# Patient Record
Sex: FEMALE | Race: WHITE
[De-identification: names, ages, dates, MRNs, and addresses within clinical notes are randomized per-mention and may not be internally consistent; named-entity substitution may affect disease eponyms.]

---

## 2019-01-08 ENCOUNTER — HOSPITAL ENCOUNTER (EMERGENCY)
Dept: HOSPITAL 54 - ER | Age: 49
Discharge: HOME | End: 2019-01-08
Payer: COMMERCIAL

## 2019-01-08 VITALS — DIASTOLIC BLOOD PRESSURE: 60 MMHG | SYSTOLIC BLOOD PRESSURE: 90 MMHG

## 2019-01-08 VITALS — BODY MASS INDEX: 22.33 KG/M2 | WEIGHT: 134 LBS | HEIGHT: 65 IN

## 2019-01-08 DIAGNOSIS — E03.9: ICD-10-CM

## 2019-01-08 DIAGNOSIS — E86.0: Primary | ICD-10-CM

## 2019-01-08 DIAGNOSIS — F41.9: ICD-10-CM

## 2019-01-08 LAB
ALBUMIN SERPL BCP-MCNC: 3.7 G/DL (ref 3.4–5)
ALP SERPL-CCNC: 49 U/L (ref 46–116)
ALT SERPL W P-5'-P-CCNC: 17 U/L (ref 12–78)
AST SERPL W P-5'-P-CCNC: 16 U/L (ref 15–37)
BASOPHILS # BLD AUTO: 0 /CMM (ref 0–0.2)
BASOPHILS NFR BLD AUTO: 0.2 % (ref 0–2)
BILIRUB DIRECT SERPL-MCNC: 0.1 MG/DL (ref 0–0.2)
BILIRUB SERPL-MCNC: 0.7 MG/DL (ref 0.2–1)
BUN SERPL-MCNC: 18 MG/DL (ref 7–18)
CALCIUM SERPL-MCNC: 8.6 MG/DL (ref 8.5–10.1)
CHLORIDE SERPL-SCNC: 102 MMOL/L (ref 98–107)
CO2 SERPL-SCNC: 27 MMOL/L (ref 21–32)
CREAT SERPL-MCNC: 0.8 MG/DL (ref 0.6–1.3)
EOSINOPHIL NFR BLD AUTO: 2.8 % (ref 0–6)
GLUCOSE SERPL-MCNC: 78 MG/DL (ref 74–106)
HCT VFR BLD AUTO: 43 % (ref 33–45)
HGB BLD-MCNC: 14.7 G/DL (ref 11.5–14.8)
LYMPHOCYTES NFR BLD AUTO: 1.4 /CMM (ref 0.8–4.8)
LYMPHOCYTES NFR BLD AUTO: 18.4 % (ref 20–44)
MCHC RBC AUTO-ENTMCNC: 34 G/DL (ref 31–36)
MCV RBC AUTO: 93 FL (ref 82–100)
MONOCYTES NFR BLD AUTO: 0.4 /CMM (ref 0.1–1.3)
MONOCYTES NFR BLD AUTO: 5 % (ref 2–12)
NEUTROPHILS # BLD AUTO: 5.5 /CMM (ref 1.8–8.9)
NEUTROPHILS NFR BLD AUTO: 73.6 % (ref 43–81)
PLATELET # BLD AUTO: 222 /CMM (ref 150–450)
POTASSIUM SERPL-SCNC: 4 MMOL/L (ref 3.5–5.1)
PROT SERPL-MCNC: 7.5 G/DL (ref 6.4–8.2)
RBC # BLD AUTO: 4.68 MIL/UL (ref 4–5.2)
SODIUM SERPL-SCNC: 135 MMOL/L (ref 136–145)
WBC NRBC COR # BLD AUTO: 7.5 K/UL (ref 4.3–11)

## 2019-01-08 PROCEDURE — 85025 COMPLETE CBC W/AUTO DIFF WBC: CPT

## 2019-01-08 PROCEDURE — 36415 COLL VENOUS BLD VENIPUNCTURE: CPT

## 2019-01-08 PROCEDURE — 99284 EMERGENCY DEPT VISIT MOD MDM: CPT

## 2019-01-08 PROCEDURE — 96361 HYDRATE IV INFUSION ADD-ON: CPT

## 2019-01-08 PROCEDURE — 84484 ASSAY OF TROPONIN QUANT: CPT

## 2019-01-08 PROCEDURE — 93005 ELECTROCARDIOGRAM TRACING: CPT

## 2019-01-08 PROCEDURE — 85730 THROMBOPLASTIN TIME PARTIAL: CPT

## 2019-01-08 PROCEDURE — 80076 HEPATIC FUNCTION PANEL: CPT

## 2019-01-08 PROCEDURE — 71045 X-RAY EXAM CHEST 1 VIEW: CPT

## 2019-01-08 PROCEDURE — 80048 BASIC METABOLIC PNL TOTAL CA: CPT

## 2019-01-08 PROCEDURE — 84443 ASSAY THYROID STIM HORMONE: CPT

## 2019-01-08 PROCEDURE — 70450 CT HEAD/BRAIN W/O DYE: CPT

## 2019-01-08 PROCEDURE — 96374 THER/PROPH/DIAG INJ IV PUSH: CPT

## 2019-01-08 NOTE — NUR
Patient discharged to home in stable condition. Written and verbal after care 
instructions given. Patient verbalizes understanding of instruction. IV 
removed. Catheter intact and site benign. Pressure and 4x4 applied to site. No 
bleeding noted.

## 2019-01-08 NOTE — NUR
PT BIBSELF FROM HOME, C/O N/V/FAINTNESS SINCE THIS AM, PT AAOX4, RESPIRATIONS 
EVEN AND UNLABORED, NO SOB, DENIES AND PAIN/DISCOMFORT, PT ON MONITOR, PENDING 
ER PROVIDER EVELIN

## 2019-04-03 ENCOUNTER — HOSPITAL ENCOUNTER (INPATIENT)
Dept: HOSPITAL 54 - ER | Age: 49
LOS: 4 days | Discharge: HOME | DRG: 248 | End: 2019-04-07
Attending: INTERNAL MEDICINE | Admitting: INTERNAL MEDICINE
Payer: COMMERCIAL

## 2019-04-03 VITALS — HEIGHT: 67 IN | BODY MASS INDEX: 20.4 KG/M2 | WEIGHT: 130 LBS

## 2019-04-03 DIAGNOSIS — E87.6: ICD-10-CM

## 2019-04-03 DIAGNOSIS — A04.9: Primary | ICD-10-CM

## 2019-04-03 DIAGNOSIS — E86.0: ICD-10-CM

## 2019-04-03 DIAGNOSIS — B00.1: ICD-10-CM

## 2019-04-03 DIAGNOSIS — E88.09: ICD-10-CM

## 2019-04-03 DIAGNOSIS — R74.8: ICD-10-CM

## 2019-04-03 DIAGNOSIS — E03.9: ICD-10-CM

## 2019-04-03 DIAGNOSIS — G47.00: ICD-10-CM

## 2019-04-03 DIAGNOSIS — E87.5: ICD-10-CM

## 2019-04-03 DIAGNOSIS — E87.2: ICD-10-CM

## 2019-04-03 DIAGNOSIS — E83.42: ICD-10-CM

## 2019-04-03 DIAGNOSIS — E44.1: ICD-10-CM

## 2019-04-03 DIAGNOSIS — I95.9: ICD-10-CM

## 2019-04-03 DIAGNOSIS — E87.1: ICD-10-CM

## 2019-04-03 LAB
ALBUMIN SERPL BCP-MCNC: 3.3 G/DL (ref 3.4–5)
ALP SERPL-CCNC: 94 U/L (ref 46–116)
ALT SERPL W P-5'-P-CCNC: 11 U/L (ref 12–78)
AST SERPL W P-5'-P-CCNC: 15 U/L (ref 15–37)
BASOPHILS # BLD AUTO: 0 /CMM (ref 0–0.2)
BASOPHILS NFR BLD AUTO: 0.1 % (ref 0–2)
BILIRUB DIRECT SERPL-MCNC: 0.1 MG/DL (ref 0–0.2)
BILIRUB SERPL-MCNC: 0.8 MG/DL (ref 0.2–1)
BUN SERPL-MCNC: 6 MG/DL (ref 7–18)
CALCIUM SERPL-MCNC: 9.4 MG/DL (ref 8.5–10.1)
CHLORIDE SERPL-SCNC: 98 MMOL/L (ref 98–107)
CO2 SERPL-SCNC: 22 MMOL/L (ref 21–32)
CREAT SERPL-MCNC: 0.5 MG/DL (ref 0.6–1.3)
EOSINOPHIL NFR BLD AUTO: 2.2 % (ref 0–6)
GLUCOSE SERPL-MCNC: 95 MG/DL (ref 74–106)
HCT VFR BLD AUTO: 51 % (ref 33–45)
HGB BLD-MCNC: 16.7 G/DL (ref 11.5–14.8)
LIPASE SERPL-CCNC: 57 U/L (ref 73–393)
LYMPHOCYTES NFR BLD AUTO: 1.6 /CMM (ref 0.8–4.8)
LYMPHOCYTES NFR BLD AUTO: 17.5 % (ref 20–44)
MCHC RBC AUTO-ENTMCNC: 33 G/DL (ref 31–36)
MCV RBC AUTO: 95 FL (ref 82–100)
MONOCYTES NFR BLD AUTO: 0.7 /CMM (ref 0.1–1.3)
MONOCYTES NFR BLD AUTO: 7.5 % (ref 2–12)
NEUTROPHILS # BLD AUTO: 6.7 /CMM (ref 1.8–8.9)
NEUTROPHILS NFR BLD AUTO: 72.7 % (ref 43–81)
PLATELET # BLD AUTO: 282 /CMM (ref 150–450)
POTASSIUM SERPL-SCNC: 3 MMOL/L (ref 3.5–5.1)
PROT SERPL-MCNC: 7.8 G/DL (ref 6.4–8.2)
RBC # BLD AUTO: 5.39 MIL/UL (ref 4–5.2)
SODIUM SERPL-SCNC: 135 MMOL/L (ref 136–145)
T4 (THYROXINE): 12.1 UG/DL (ref 4.7–13.3)
TSH SERPL DL<=0.005 MIU/L-ACNC: 1.84 UIU/ML (ref 0.36–3.74)
WBC NRBC COR # BLD AUTO: 9.2 K/UL (ref 4.3–11)

## 2019-04-03 PROCEDURE — G0378 HOSPITAL OBSERVATION PER HR: HCPCS

## 2019-04-03 PROCEDURE — A4216 STERILE WATER/SALINE, 10 ML: HCPCS

## 2019-04-03 RX ADMIN — POTASSIUM CHLORIDE SCH MLS/HR: 200 INJECTION, SOLUTION INTRAVENOUS at 23:35

## 2019-04-03 NOTE — NUR
PT BBRELATIVE FROM HOME C/C N/V/D X4 DAYS, UNABLE TO KEEP FLUIDS DOWN. 
AFEBRILE. PT C/O OF DIZZINESS, WEAKNESS WHEN STANDING OR SITTING. DENIES 
CARDIAC HX. PT ON MONITOR IN BED 9 WITH FAMILY AT BEDSIDE. WILL CONTINUE TO 
MONITOR.

## 2019-04-04 VITALS — SYSTOLIC BLOOD PRESSURE: 106 MMHG | DIASTOLIC BLOOD PRESSURE: 66 MMHG

## 2019-04-04 VITALS — SYSTOLIC BLOOD PRESSURE: 108 MMHG | DIASTOLIC BLOOD PRESSURE: 65 MMHG

## 2019-04-04 VITALS — DIASTOLIC BLOOD PRESSURE: 62 MMHG | SYSTOLIC BLOOD PRESSURE: 107 MMHG

## 2019-04-04 VITALS — DIASTOLIC BLOOD PRESSURE: 66 MMHG | SYSTOLIC BLOOD PRESSURE: 107 MMHG

## 2019-04-04 VITALS — SYSTOLIC BLOOD PRESSURE: 119 MMHG | DIASTOLIC BLOOD PRESSURE: 71 MMHG

## 2019-04-04 VITALS — SYSTOLIC BLOOD PRESSURE: 102 MMHG | DIASTOLIC BLOOD PRESSURE: 64 MMHG

## 2019-04-04 LAB
ALBUMIN SERPL BCP-MCNC: 3.2 G/DL (ref 3.4–5)
ALBUMIN SERPL BCP-MCNC: 3.3 G/DL (ref 3.4–5)
ALP SERPL-CCNC: 49 U/L (ref 46–116)
ALP SERPL-CCNC: 50 U/L (ref 46–116)
ALT SERPL W P-5'-P-CCNC: 17 U/L (ref 12–78)
ALT SERPL W P-5'-P-CCNC: 19 U/L (ref 12–78)
AST SERPL W P-5'-P-CCNC: 22 U/L (ref 15–37)
AST SERPL W P-5'-P-CCNC: 25 U/L (ref 15–37)
BASOPHILS # BLD AUTO: 0 /CMM (ref 0–0.2)
BASOPHILS NFR BLD AUTO: 0.1 % (ref 0–2)
BILIRUB SERPL-MCNC: 0.5 MG/DL (ref 0.2–1)
BILIRUB SERPL-MCNC: 0.6 MG/DL (ref 0.2–1)
BILIRUB UR QL STRIP: (no result)
BUN SERPL-MCNC: 15 MG/DL (ref 7–18)
BUN SERPL-MCNC: 17 MG/DL (ref 7–18)
CALCIUM SERPL-MCNC: 8.3 MG/DL (ref 8.5–10.1)
CALCIUM SERPL-MCNC: 8.4 MG/DL (ref 8.5–10.1)
CHLORIDE SERPL-SCNC: 102 MMOL/L (ref 98–107)
CHLORIDE SERPL-SCNC: 105 MMOL/L (ref 98–107)
CO2 SERPL-SCNC: 10 MMOL/L (ref 21–32)
CO2 SERPL-SCNC: 7 MMOL/L (ref 21–32)
CREAT SERPL-MCNC: 1.1 MG/DL (ref 0.6–1.3)
CREAT SERPL-MCNC: 1.1 MG/DL (ref 0.6–1.3)
EOSINOPHIL NFR BLD AUTO: 0 % (ref 0–6)
GLUCOSE SERPL-MCNC: 155 MG/DL (ref 74–106)
GLUCOSE SERPL-MCNC: 96 MG/DL (ref 74–106)
HCT VFR BLD AUTO: 45 % (ref 33–45)
HGB BLD-MCNC: 14.7 G/DL (ref 11.5–14.8)
KETONES UR STRIP-MCNC: >=160 MG/DL
LYMPHOCYTES NFR BLD AUTO: 0.3 /CMM (ref 0.8–4.8)
LYMPHOCYTES NFR BLD AUTO: 6.4 % (ref 20–44)
MAGNESIUM SERPL-MCNC: 1.5 MG/DL (ref 1.8–2.4)
MCHC RBC AUTO-ENTMCNC: 33 G/DL (ref 31–36)
MCV RBC AUTO: 94 FL (ref 82–100)
MONOCYTES NFR BLD AUTO: 0.1 /CMM (ref 0.1–1.3)
MONOCYTES NFR BLD AUTO: 1 % (ref 2–12)
NEUTROPHILS # BLD AUTO: 4.8 /CMM (ref 1.8–8.9)
NEUTROPHILS NFR BLD AUTO: 92.5 % (ref 43–81)
PH UR STRIP: 5.5 [PH] (ref 5–8)
PHOSPHATE SERPL-MCNC: 2.8 MG/DL (ref 2.5–4.9)
PLATELET # BLD AUTO: 177 /CMM (ref 150–450)
POTASSIUM SERPL-SCNC: 5.3 MMOL/L (ref 3.5–5.1)
POTASSIUM SERPL-SCNC: 6.3 MMOL/L (ref 3.5–5.1)
PROT SERPL-MCNC: 7.2 G/DL (ref 6.4–8.2)
PROT SERPL-MCNC: 7.4 G/DL (ref 6.4–8.2)
PROT UR QL STRIP: 100 MG/DL
RBC # BLD AUTO: 4.76 MIL/UL (ref 4–5.2)
RBC #/AREA URNS HPF: (no result) /HPF (ref 0–2)
SODIUM SERPL-SCNC: 131 MMOL/L (ref 136–145)
SODIUM SERPL-SCNC: 133 MMOL/L (ref 136–145)
UROBILINOGEN UR STRIP-MCNC: 0.2 EU/DL
WBC #/AREA URNS HPF: (no result) /HPF (ref 0–3)
WBC NRBC COR # BLD AUTO: 5.2 K/UL (ref 4.3–11)

## 2019-04-04 RX ADMIN — MAGNESIUM SULFATE IN DEXTROSE SCH MLS/HR: 10 INJECTION, SOLUTION INTRAVENOUS at 16:46

## 2019-04-04 RX ADMIN — POTASSIUM CHLORIDE SCH MLS/HR: 200 INJECTION, SOLUTION INTRAVENOUS at 02:22

## 2019-04-04 RX ADMIN — PANTOPRAZOLE SODIUM SCH MG: 40 TABLET, DELAYED RELEASE ORAL at 08:01

## 2019-04-04 RX ADMIN — PIPERACILLIN SODIUM AND TAZOBACTAM SODIUM SCH MLS/HR: .375; 3 INJECTION, POWDER, LYOPHILIZED, FOR SOLUTION INTRAVENOUS at 10:54

## 2019-04-04 RX ADMIN — POTASSIUM CHLORIDE SCH MLS/HR: 200 INJECTION, SOLUTION INTRAVENOUS at 01:17

## 2019-04-04 RX ADMIN — MAGNESIUM SULFATE IN DEXTROSE SCH MLS/HR: 10 INJECTION, SOLUTION INTRAVENOUS at 12:02

## 2019-04-04 RX ADMIN — PIPERACILLIN SODIUM AND TAZOBACTAM SODIUM SCH MLS/HR: .375; 3 INJECTION, POWDER, LYOPHILIZED, FOR SOLUTION INTRAVENOUS at 18:59

## 2019-04-04 RX ADMIN — POTASSIUM CHLORIDE SCH MLS/HR: 200 INJECTION, SOLUTION INTRAVENOUS at 03:38

## 2019-04-04 NOTE — NUR
RN CLOSING NOTE 



PATIENT AWAKE AND ALERT. NO COMPLAINS OF ANY PAIN OR ANY DISTRESS DURING THE DAY SHIFT. HAD 
4X DIARRHEA TODAY. MD AWARE. REPLACED MAGNESIUM AND CHANGED IVF TO NS  ML/HR DUE TO 
HYPERKALEMIA. ALL MEDS ARE GIVEN, ALL NEEDS MET. BED ON LOWEST POSITION. CALL LIGHT WITHIN 
REACH. WILL CONT TO MONITOR CLOSELY. 

-------------------------------------------------------------------------------

Addendum: 04/04/19 at 1852 by CAROLINA CAPONE RN

-------------------------------------------------------------------------------

WILL ENDORSE TO NOC CANDE RN

## 2019-04-04 NOTE — NUR
RN NOTES

RECEIVED PT. FROM ER WITH DX. OF DIFFUSE COLITIS, ST ON TELE MONITOR HR-125, A/OX4, 
AMBULATORY, DENIES PAIN, NO N/V, ADMISSION INSTRUCTION WAS GIVEN, CALL LIGHT WITHIN REACH, 
SIDERAILSUPX2, CONTINUE TO MONITOR

## 2019-04-04 NOTE — NUR
RN OPENING NOTE



RECEIVED PATIENT IN BED AWAKE AND ALERT. AMBULATED TO GO TO THE BATHROOM. CAME IN LAST NIGHT 
DUE TO DIFFUSE COLITIS. PATIENT ON TELE MONITOR, [place] 114. NOC SHIFT RN SAID MD IS 
AWARE. PATIENT ON CLEAR LIQUID DIET. HAS LEFT AC #20 WITH NS + 20 MEQ OF KCL RUNNING AT 
100ML/HR. SKIN IS INTACT. BED ON LOWEST POSITION. CALL LIGHT WITHIN REACH. WILL CONTINUE TO 
MONITOR THROUGHOUT THE SHIFT

## 2019-04-04 NOTE — NUR
TELE RN NOTE



RECEIVED PT IN STABLE CONDITION, A&O X4, ABLE TO MAKE NEEDS KNOWN. CURRENTLY WATCHING TV 
WITH GUEST. TELE MONITOR READING . IV IN L AC PATENT AND INTACT WITH IVF INFUSING. NO 
SIGNS OF SOB OR DISTRESS, NO C/O PAIN. ALL CURRENT NEEDS ATTENDED TO. SAFETY PRECAUTIONS IN 
PLACE: BED LOW, LOCKED, UPPER RAILS UP, AND CALL LIGHT WITHIN RANGE. WILL CONT. TO MONITOR.

## 2019-04-04 NOTE — NUR
RN NOTE



PATIENT REPORTED THAT SHE HAD A LARGE BM, DIARRHEA. GREEN/GOLD COLOR, NO BLOOD WAS NOTED. 

-------------------------------------------------------------------------------

Addendum: 04/04/19 at 1835 by CAROLINA CAPONE RN

-------------------------------------------------------------------------------

TOTAL BM TODAY WAS 4. ALL DIARRHEA, DR RILEY AWARE

## 2019-04-05 VITALS — SYSTOLIC BLOOD PRESSURE: 126 MMHG | DIASTOLIC BLOOD PRESSURE: 60 MMHG

## 2019-04-05 VITALS — SYSTOLIC BLOOD PRESSURE: 97 MMHG | DIASTOLIC BLOOD PRESSURE: 64 MMHG

## 2019-04-05 VITALS — SYSTOLIC BLOOD PRESSURE: 102 MMHG | DIASTOLIC BLOOD PRESSURE: 68 MMHG

## 2019-04-05 VITALS — DIASTOLIC BLOOD PRESSURE: 60 MMHG | SYSTOLIC BLOOD PRESSURE: 99 MMHG

## 2019-04-05 VITALS — DIASTOLIC BLOOD PRESSURE: 60 MMHG | SYSTOLIC BLOOD PRESSURE: 126 MMHG

## 2019-04-05 VITALS — DIASTOLIC BLOOD PRESSURE: 59 MMHG | SYSTOLIC BLOOD PRESSURE: 102 MMHG

## 2019-04-05 VITALS — DIASTOLIC BLOOD PRESSURE: 56 MMHG | SYSTOLIC BLOOD PRESSURE: 109 MMHG

## 2019-04-05 LAB
BUN SERPL-MCNC: 14 MG/DL (ref 7–18)
CALCIUM SERPL-MCNC: 8.5 MG/DL (ref 8.5–10.1)
CHLORIDE SERPL-SCNC: 105 MMOL/L (ref 98–107)
CO2 SERPL-SCNC: 14 MMOL/L (ref 21–32)
CREAT SERPL-MCNC: 0.8 MG/DL (ref 0.6–1.3)
GLUCOSE SERPL-MCNC: 166 MG/DL (ref 74–106)
MAGNESIUM SERPL-MCNC: 1.8 MG/DL (ref 1.8–2.4)
POTASSIUM SERPL-SCNC: 4.7 MMOL/L (ref 3.5–5.1)
SODIUM SERPL-SCNC: 134 MMOL/L (ref 136–145)
T3 SERPL-MCNC: 88 NG/DL (ref 71–180)
T3FREE SERPL-MCNC: 2 PG/ML (ref 2–4.4)

## 2019-04-05 RX ADMIN — PIPERACILLIN SODIUM AND TAZOBACTAM SODIUM SCH MLS/HR: .375; 3 INJECTION, POWDER, LYOPHILIZED, FOR SOLUTION INTRAVENOUS at 11:06

## 2019-04-05 RX ADMIN — Medication SCH MLS/HR: at 21:00

## 2019-04-05 RX ADMIN — PIPERACILLIN SODIUM AND TAZOBACTAM SODIUM SCH MLS/HR: .375; 3 INJECTION, POWDER, LYOPHILIZED, FOR SOLUTION INTRAVENOUS at 02:00

## 2019-04-05 RX ADMIN — SODIUM CHLORIDE, SODIUM LACTATE, POTASSIUM CHLORIDE, AND CALCIUM CHLORIDE PRN MLS/HR: .6; .31; .03; .02 INJECTION, SOLUTION INTRAVENOUS at 18:01

## 2019-04-05 RX ADMIN — PIPERACILLIN SODIUM AND TAZOBACTAM SODIUM SCH MLS/HR: .375; 3 INJECTION, POWDER, LYOPHILIZED, FOR SOLUTION INTRAVENOUS at 19:42

## 2019-04-05 RX ADMIN — Medication SCH MLS/HR: at 18:00

## 2019-04-05 RX ADMIN — PANTOPRAZOLE SODIUM SCH MG: 40 TABLET, DELAYED RELEASE ORAL at 07:52

## 2019-04-05 NOTE — NUR
RN OPENING NOTES

RECEIVED REPORT FROM NIGHT SHIFT RN. PT IS ON TELE MONITOR READING SR 90'S. PT IS A&OX 4. PT 
IS ABLE TO MAKE NEEDS KNOWN. PT DENIES ANY PAIN AT PRESENT MOMENT AND SOB. CALL LIGHT IS 
WITHIN REACH AND BED IS IN LOWEST AND LOCKED POSITION PT ENCOURAGED TO CALL STAFF IF SHE 
WISHES TO AMBULATE. WILL CONTINUE TO MONITOR.

## 2019-04-05 NOTE — NUR
RN CLOSING NOTES

GAVE REPORT TO NIGHT SHIFT RN. PT IS ON TELE MONITOR READING SR 80'S. PT IS A&OX 4. PT IS 
ABLE TO MAKE NEEDS KNOWN. PT DENIES ANY PAIN AT PRESENT MOMENT AND SOB. CALL LIGHT IS WITHIN 
REACH AND BED IS IN LOWEST AND LOCKED POSITION PT ENCOURAGED TO CALL STAFF IF SHE WISHES TO 
AMBULATE. WILL ENDORSE CONTINUITY OF CARE TO NIGHT SHIFT RN.

## 2019-04-05 NOTE — NUR
TELE RN NOTE



PT IN STABLE CONDITION, A&O X4, ABLE TO MAKE NEEDS KNOWN. TELE MONITOR READING SR 90. IV IN 
L AC PATENT AND INTACT WITH IVF INFUSING. NO SIGNS OF SOB OR DISTRESS, NO C/O PAIN. ALL 
CURRENT NEEDS ATTENDED TO. SAFETY PRECAUTIONS IN PLACE: BED LOW, LOCKED, UPPER RAILS UP, AND 
CALL LIGHT WITHIN RANGE. WILL CONT. TO MONITOR AND ENDORSE TO NEXT SHIFT FOR ALEX.

## 2019-04-05 NOTE — NUR
RN INITIAL NOTES

RECEIVED REPORT FROM AM SHIFT RN. PT IS ON TELE MONITOR READING SR 80'S. PT IS A&OX 4. PT IS 
ABLE TO MAKE NEEDS KNOWN. PT DENIES ANY PAIN AT PRESENT MOMENT AND SOB. CALL LIGHT IS WITHIN 
REACH AND BED IS IN LOWEST AND LOCKED POSITION PT ENCOURAGED TO CALL STAFF IF SHE WISHES TO 
AMBULATE. WILL CONTINUE TO MONITOR.

## 2019-04-06 VITALS — SYSTOLIC BLOOD PRESSURE: 110 MMHG | DIASTOLIC BLOOD PRESSURE: 60 MMHG

## 2019-04-06 VITALS — SYSTOLIC BLOOD PRESSURE: 105 MMHG | DIASTOLIC BLOOD PRESSURE: 65 MMHG

## 2019-04-06 VITALS — DIASTOLIC BLOOD PRESSURE: 60 MMHG | SYSTOLIC BLOOD PRESSURE: 102 MMHG

## 2019-04-06 VITALS — SYSTOLIC BLOOD PRESSURE: 98 MMHG | DIASTOLIC BLOOD PRESSURE: 55 MMHG

## 2019-04-06 VITALS — DIASTOLIC BLOOD PRESSURE: 58 MMHG | SYSTOLIC BLOOD PRESSURE: 105 MMHG

## 2019-04-06 VITALS — DIASTOLIC BLOOD PRESSURE: 60 MMHG | SYSTOLIC BLOOD PRESSURE: 110 MMHG

## 2019-04-06 LAB
BASOPHILS # BLD AUTO: 0 /CMM (ref 0–0.2)
BASOPHILS NFR BLD AUTO: 0.1 % (ref 0–2)
BUN SERPL-MCNC: 8 MG/DL (ref 7–18)
CALCIUM SERPL-MCNC: 8 MG/DL (ref 8.5–10.1)
CHLORIDE SERPL-SCNC: 109 MMOL/L (ref 98–107)
CO2 SERPL-SCNC: 20 MMOL/L (ref 21–32)
CREAT SERPL-MCNC: 0.7 MG/DL (ref 0.6–1.3)
EOSINOPHIL NFR BLD AUTO: 0.3 % (ref 0–6)
GLUCOSE SERPL-MCNC: 96 MG/DL (ref 74–106)
HCT VFR BLD AUTO: 31 % (ref 33–45)
HGB BLD-MCNC: 10.6 G/DL (ref 11.5–14.8)
LYMPHOCYTES NFR BLD AUTO: 1 /CMM (ref 0.8–4.8)
LYMPHOCYTES NFR BLD AUTO: 22.3 % (ref 20–44)
MAGNESIUM SERPL-MCNC: 1.6 MG/DL (ref 1.8–2.4)
MCHC RBC AUTO-ENTMCNC: 35 G/DL (ref 31–36)
MCV RBC AUTO: 90 FL (ref 82–100)
MONOCYTES NFR BLD AUTO: 0.4 /CMM (ref 0.1–1.3)
MONOCYTES NFR BLD AUTO: 8.2 % (ref 2–12)
NEUTROPHILS # BLD AUTO: 3 /CMM (ref 1.8–8.9)
NEUTROPHILS NFR BLD AUTO: 69.1 % (ref 43–81)
PHOSPHATE SERPL-MCNC: 1.5 MG/DL (ref 2.5–4.9)
PLATELET # BLD AUTO: 120 /CMM (ref 150–450)
POTASSIUM SERPL-SCNC: 3.4 MMOL/L (ref 3.5–5.1)
RBC # BLD AUTO: 3.4 MIL/UL (ref 4–5.2)
SODIUM SERPL-SCNC: 140 MMOL/L (ref 136–145)
WBC NRBC COR # BLD AUTO: 4.3 K/UL (ref 4.3–11)

## 2019-04-06 RX ADMIN — PANTOPRAZOLE SODIUM SCH MG: 40 TABLET, DELAYED RELEASE ORAL at 07:45

## 2019-04-06 RX ADMIN — PIPERACILLIN SODIUM AND TAZOBACTAM SODIUM SCH MLS/HR: .375; 3 INJECTION, POWDER, LYOPHILIZED, FOR SOLUTION INTRAVENOUS at 03:17

## 2019-04-06 RX ADMIN — MAGNESIUM SULFATE IN DEXTROSE SCH MLS/HR: 10 INJECTION, SOLUTION INTRAVENOUS at 10:05

## 2019-04-06 RX ADMIN — Medication SCH MLS/HR: at 05:17

## 2019-04-06 RX ADMIN — Medication SCH MLS/HR: at 12:31

## 2019-04-06 RX ADMIN — PIPERACILLIN SODIUM AND TAZOBACTAM SODIUM SCH MLS/HR: .375; 3 INJECTION, POWDER, LYOPHILIZED, FOR SOLUTION INTRAVENOUS at 11:18

## 2019-04-06 RX ADMIN — SODIUM CHLORIDE, SODIUM LACTATE, POTASSIUM CHLORIDE, AND CALCIUM CHLORIDE PRN MLS/HR: .6; .31; .03; .02 INJECTION, SOLUTION INTRAVENOUS at 13:04

## 2019-04-06 RX ADMIN — MAGNESIUM SULFATE IN DEXTROSE SCH MLS/HR: 10 INJECTION, SOLUTION INTRAVENOUS at 11:18

## 2019-04-06 RX ADMIN — PIPERACILLIN SODIUM AND TAZOBACTAM SODIUM SCH MLS/HR: .375; 3 INJECTION, POWDER, LYOPHILIZED, FOR SOLUTION INTRAVENOUS at 18:28

## 2019-04-06 RX ADMIN — Medication SCH MLS/HR: at 20:20

## 2019-04-06 NOTE — NUR
TELE RN OPENING NOTES

RECEIVED REPORT FROM PM SHIFT RN. PT IS ON TELE MONITOR READING SR 80'S. PT IS A&OX 4.  ABLE 
TO MAKE NEEDS KNOWN. PT DENIES ANY PAIN AT THIS TIME.ON RA,NO SOB NO DISTRESS NOTED AT THIS 
TIME.CALL LIGHT IS WITHIN REACH AND BED IS IN LOW AND LOCKED POSITION .PT ENCOURAGED TO CALL 
FOR HELP. SRX2.WILL CONTINUE TO MONITOR.

## 2019-04-06 NOTE — NUR
MS RN NOTE:



RECEIVED PT ON BED ALERT AND ORIENTED X4. ABLE TO MAKE NEEDS KNOWN. NO APPARENT DISTRESS 
NOTED. NO COMPLAINTS OF PAIN OR DISCOMFORT AT THIS TIME. ON ROOM AIR, BREATHING EVEN AND 
UNLABORED WITH NORMAL RESPIRATIONS. IV ON LEFT ANTECUBITAL #20 INTACT AND PATENT, IVF 
INFUSING WELL. KEPT CLEAN, DRY AND COMFORTABLE. SAFETY AND FALL PRECAUTIONS OBSERVED AND 
MAINTAINED. CALL LIGHT PLACED WITHIN REACH. WILL CONTINUE TO MONITOR PT.

## 2019-04-06 NOTE — NUR
MS RN NOTE

SEEN BY  .UNDATED ABOUT PATIENT CONDITION WITH LABS.MADE AWARE THAT PLATELET COUNT 
TRENDING DOWN IS ON ZOSYN.GOT NEW ORDER FOR F/U LABS FOR TOMORROW.SPOKE TO PATIENT BY DOCTOR 
AND ANSWERED ALL THE QUESTIONS.WILL CONTINUE TO MONITOR.

## 2019-04-06 NOTE — NUR
RN CLOSING NOTES

 PT AOX4 CONT'  ON TELE MONITOR READING SR 80'S. PT IS A&OX 4. PT IS ABLE TO MAKE NEEDS 
KNOWN. PT DENIES ANY PAIN AT PRESENT MOMENT AND SOB, DIET ADVANCED TO FULL LIQ', BRIDGER' WELL. 
CALL LIGHT IS WITHIN REACH AND BED IS IN LOWEST AND LOCKED POSITION PT ENCOURAGED TO CALL 
STAFF IF SHE WISHES TO AMBULATE. WILL ENDORSE CONTINUITY OF CARE TO NIGHT SHIFT RN.

## 2019-04-06 NOTE — NUR
MS RN CLOSING NOTES

PT IS A&OX 4.  ABLE TO MAKE NEEDS KNOWN. PT DENIES ANY PAIN AT THIS TIME.ON RA,NO SOB NO 
DISTRESS NOTED AT THIS TIME.CALL LIGHT IS WITHIN REACH AND BED IS IN LOW AND LOCKED POSITION 
.PT ENCOURAGED TO CALL FOR HELP. SRX2.WILL ENDORSE TO PM NURSE FOR ALEX.

## 2019-04-07 VITALS — SYSTOLIC BLOOD PRESSURE: 97 MMHG | DIASTOLIC BLOOD PRESSURE: 57 MMHG

## 2019-04-07 VITALS — DIASTOLIC BLOOD PRESSURE: 61 MMHG | SYSTOLIC BLOOD PRESSURE: 107 MMHG

## 2019-04-07 VITALS — DIASTOLIC BLOOD PRESSURE: 61 MMHG | SYSTOLIC BLOOD PRESSURE: 90 MMHG

## 2019-04-07 LAB
BASOPHILS # BLD AUTO: 0 /CMM (ref 0–0.2)
BASOPHILS NFR BLD AUTO: 0.2 % (ref 0–2)
BUN SERPL-MCNC: 7 MG/DL (ref 7–18)
CALCIUM SERPL-MCNC: 7.4 MG/DL (ref 8.5–10.1)
CHLORIDE SERPL-SCNC: 108 MMOL/L (ref 98–107)
CO2 SERPL-SCNC: 24 MMOL/L (ref 21–32)
CREAT SERPL-MCNC: 0.7 MG/DL (ref 0.6–1.3)
EOSINOPHIL NFR BLD AUTO: 3 % (ref 0–6)
GLUCOSE SERPL-MCNC: 91 MG/DL (ref 74–106)
HCT VFR BLD AUTO: 30 % (ref 33–45)
HGB BLD-MCNC: 10.3 G/DL (ref 11.5–14.8)
IRON SERPL-MCNC: 32 UG/DL (ref 50–175)
LYMPHOCYTES NFR BLD AUTO: 1 /CMM (ref 0.8–4.8)
LYMPHOCYTES NFR BLD AUTO: 20.5 % (ref 20–44)
MAGNESIUM SERPL-MCNC: 1.8 MG/DL (ref 1.8–2.4)
MCHC RBC AUTO-ENTMCNC: 35 G/DL (ref 31–36)
MCV RBC AUTO: 90 FL (ref 82–100)
MONOCYTES NFR BLD AUTO: 0.4 /CMM (ref 0.1–1.3)
MONOCYTES NFR BLD AUTO: 7.6 % (ref 2–12)
NEUTROPHILS # BLD AUTO: 3.3 /CMM (ref 1.8–8.9)
NEUTROPHILS NFR BLD AUTO: 68.7 % (ref 43–81)
PHOSPHATE SERPL-MCNC: 2.5 MG/DL (ref 2.5–4.9)
PLATELET # BLD AUTO: 116 /CMM (ref 150–450)
POTASSIUM SERPL-SCNC: 3.5 MMOL/L (ref 3.5–5.1)
RBC # BLD AUTO: 3.28 MIL/UL (ref 4–5.2)
SODIUM SERPL-SCNC: 139 MMOL/L (ref 136–145)
TIBC SERPL-MCNC: 162 UG/DL (ref 250–450)
TSH SERPL DL<=0.005 MIU/L-ACNC: 8.18 UIU/ML (ref 0.36–3.74)
WBC NRBC COR # BLD AUTO: 4.8 K/UL (ref 4.3–11)

## 2019-04-07 RX ADMIN — PIPERACILLIN SODIUM AND TAZOBACTAM SODIUM SCH MLS/HR: .375; 3 INJECTION, POWDER, LYOPHILIZED, FOR SOLUTION INTRAVENOUS at 11:07

## 2019-04-07 RX ADMIN — PANTOPRAZOLE SODIUM SCH MG: 40 TABLET, DELAYED RELEASE ORAL at 08:29

## 2019-04-07 RX ADMIN — SODIUM CHLORIDE, SODIUM LACTATE, POTASSIUM CHLORIDE, AND CALCIUM CHLORIDE PRN MLS/HR: .6; .31; .03; .02 INJECTION, SOLUTION INTRAVENOUS at 05:05

## 2019-04-07 RX ADMIN — Medication SCH MLS/HR: at 05:04

## 2019-04-07 RX ADMIN — PIPERACILLIN SODIUM AND TAZOBACTAM SODIUM SCH MLS/HR: .375; 3 INJECTION, POWDER, LYOPHILIZED, FOR SOLUTION INTRAVENOUS at 02:22

## 2019-04-07 NOTE — NUR
RN NOTES



PATIENT DISCHARGE. ALL QUESTIONS AND CONCERNS ADDRESSED APPROPRIATELY. DISCHARGE AND 
MEDICATION INSTRUCTIONS GIVEN TO THE PATIENT. PATIENT NOT A CANDIDATE FOR PNEUMONIA VACCINE. 
MEDICATION RETURNED TO THE PATIENT. ALL BELONGINGS ACCOUNTED AND RETURNED TO THE PATENT. ID 
BAND REMOVED. PATIENT WENT OUT OF THE UNIT AMBULATORY ACCOMPANIED BY THE SIGNIFICANT OTHER.

## 2019-04-07 NOTE — NUR
RN NOTES



PATIENT COMPLAINED OF PAIN ON THE IV SITE AT THIS TIME, REDNESS ON THE SURROUNDING AREA 
NOTED. IV INFUSION STOP AND IV CATH REMOVED. PATIENT REFUSED TO HAVE IV INSERTED AT THIS 
TIME. HAVE ASKED  FOR SOMETIME TO THINK ABOUT IT, PATIENT WOULDN'T WANT TO GET POKE AT THIS 
IN HOPE OF GETTING DISCHARGE TODAY. DR ROGERS AT THE UNIT AND INFORMED, PER LATER, PATIENT 
IS OK TO GO BUT WOULD WANT TO KNOW THE CORTISOL AM RESULT PRIOR TO DISCHARGE

## 2019-04-07 NOTE — NUR
MS RN NOTE:



NO CHANGES NOTED THROUGHOUT THE SHIFT. NO APPARENT DISTRESS NOTED. DENIES PAIN AND 
DISCOMFORT AT THIS TIME. NO SOB NOTED. IV ON LEFT ANTECUBITAL #20 INTACT AND PATENT, IVF 
INFUSING WELL. NO SIGNS/SYMPTOMS OF INFILTRATION NOTED. KEPT CLEAN, DRY AND COMFORTABLE. 
SAFETY AND FALL PRECAUTIONS OBSERVED AND MAINTAINED. WILL ENDORSE TO DAY SHIFT RN FOR 
CONTINUITY OF CARE.

## 2019-04-07 NOTE — NUR
RN NOTEs



RECEIVED PATIENT IN BED, ON SITTING POSITION, AWAKE, A&O X4, ABLE TO MAKE NEEDS KNOWN. NOT 
ON ANY FORM OF DISTRESS, NO SOB, ON ROOM AIR. NO COMPLAINTS OF PAIN OF ANY KIND. IV ACCESS 
NOTED ON THE  L AC: IN PLACE AND INTACT, PATENT ON FLUSHING, DRESSING CDI, NO S/S OF 
INFILTRATION OR INFECTION.  WITH INFUSING D51/2 NS +10 MEQS OF POTASSIUM CHLORIDE AT 
75CC/HR. SAFETY PRECAUTIONS  OBSERVED AND MAINTAINED IN PLACE: BED LOW, LOCKED, UPPER RAILS 
UP, ENCOURAGE TO USE CALL LIGHT FOR HELP OR ASSISTANCE. CALL LIGHT WITHIN RANGE. QUESTIONS 
AND CONCERNS ADDRESSED APPROPRIATELY. WILL CONTINUE TO MONITOR

## 2019-04-09 LAB — ACTH, PLASMA: 411.2 PG/ML (ref 7.2–63.3)
